# Patient Record
Sex: MALE | Race: BLACK OR AFRICAN AMERICAN | Employment: UNEMPLOYED | URBAN - METROPOLITAN AREA
[De-identification: names, ages, dates, MRNs, and addresses within clinical notes are randomized per-mention and may not be internally consistent; named-entity substitution may affect disease eponyms.]

---

## 2019-06-21 ENCOUNTER — HOSPITAL ENCOUNTER (EMERGENCY)
Age: 34
Discharge: HOME | End: 2019-06-21
Attending: EMERGENCY MEDICINE
Payer: COMMERCIAL

## 2019-06-21 VITALS
RESPIRATION RATE: 18 BRPM | HEART RATE: 78 BPM | OXYGEN SATURATION: 100 % | SYSTOLIC BLOOD PRESSURE: 118 MMHG | DIASTOLIC BLOOD PRESSURE: 75 MMHG | TEMPERATURE: 98.1 F

## 2019-06-21 DIAGNOSIS — M54.50 ACUTE BILATERAL LOW BACK PAIN WITHOUT SCIATICA: Primary | ICD-10-CM

## 2019-06-21 LAB
BILIRUBIN URINE: NEGATIVE
BLOOD, URINE: NEGATIVE
CLARITY: CLEAR
COLOR: YELLOW
GLUCOSE URINE: 100 MG/DL
KETONES, URINE: NEGATIVE MG/DL
LEUKOCYTE ESTERASE, URINE: NEGATIVE
MICROSCOPIC EXAMINATION: ABNORMAL
NITRITE, URINE: NEGATIVE
PH UA: 5.5 (ref 5–8)
PROTEIN UA: NEGATIVE MG/DL
SPECIFIC GRAVITY UA: 1.01 (ref 1–1.03)
URINE TYPE: ABNORMAL
UROBILINOGEN, URINE: 0.2 E.U./DL

## 2019-06-21 PROCEDURE — 81003 URINALYSIS AUTO W/O SCOPE: CPT

## 2019-06-21 PROCEDURE — 99283 EMERGENCY DEPT VISIT LOW MDM: CPT

## 2019-06-21 RX ORDER — IBUPROFEN 800 MG/1
800 TABLET ORAL EVERY 8 HOURS PRN
Qty: 20 TABLET | Refills: 0 | Status: SHIPPED | OUTPATIENT
Start: 2019-06-21 | End: 2020-01-21

## 2019-06-21 RX ORDER — IBUPROFEN 400 MG/1
800 TABLET ORAL ONCE
Status: DISCONTINUED | OUTPATIENT
Start: 2019-06-21 | End: 2019-06-21 | Stop reason: HOSPADM

## 2019-06-21 RX ORDER — METHOCARBAMOL 500 MG/1
1000 TABLET, FILM COATED ORAL ONCE
Status: DISCONTINUED | OUTPATIENT
Start: 2019-06-21 | End: 2019-06-21 | Stop reason: HOSPADM

## 2019-06-21 RX ORDER — METHOCARBAMOL 500 MG/1
500 TABLET, FILM COATED ORAL 4 TIMES DAILY
Qty: 20 TABLET | Refills: 0 | Status: SHIPPED | OUTPATIENT
Start: 2019-06-21 | End: 2019-06-26

## 2019-06-21 ASSESSMENT — PAIN SCALES - GENERAL: PAINLEVEL_OUTOF10: 6

## 2019-06-21 ASSESSMENT — PAIN DESCRIPTION - LOCATION: LOCATION: BACK

## 2019-06-21 ASSESSMENT — PAIN DESCRIPTION - PAIN TYPE: TYPE: ACUTE PAIN

## 2019-06-21 ASSESSMENT — PAIN DESCRIPTION - FREQUENCY: FREQUENCY: INTERMITTENT

## 2019-06-21 ASSESSMENT — PAIN DESCRIPTION - ORIENTATION: ORIENTATION: RIGHT;LEFT;MID

## 2019-06-21 ASSESSMENT — PAIN DESCRIPTION - DESCRIPTORS: DESCRIPTORS: CONSTANT;ACHING

## 2019-06-21 NOTE — ED PROVIDER NOTES
[address] [facility] [address] ENCOUNTER          PHYSICIAN ASSISTANT NOTE       Date of evaluation: 6/21/2019    Chief Complaint     Back Pain (Bilateral back pain and that was worse on the right that seems equal now. Pt states pain was worse in the morning. pt denies fevers and states urine has a bad odor. )      History of Present Illness     Shay Mays is a 36 y.o. male who presents bilateral \"kidney pain\" which he states he has had for 1 week. Patient states his pain is bilateral on his back and occurs in the morning after waking up. Patient states he arrives to work around 6 AM, and his symptoms stop after moving around approximately by 7:30 AM.  Patient has not taken Tylenol or ibuprofen for the symptoms. Patient is concerned, as he has had one kidney stone in the past, many years ago, which he passed on his own. Patient states at that time the pain wrapped around to his anterior abdomen, which she is not having at this time. Patient also does endorse a different smell to his urine. Denies fevers, chills, abdominal pain, nausea, vomiting, dysuria, hematuria. Review of Systems     Review of Systems   See HPI, all others negative. Past Medical, Surgical, Family, and Social History     He has a past medical history of Anxiety, Depression, Paranoia ([address] and PTSD (post-traumatic stress disorder). He has a past surgical history that includes fracture surgery. His family history is not on file. He reports that he has quit smoking. He started smoking about 2 years ago. He has never used smokeless tobacco. He reports that he drinks alcohol. He reports that he does not use drugs.     Medications     Discharge Medication List as of 6/21/2019  9:04 PM      CONTINUE these medications which have NOT CHANGED    Details   FLUoxetine (PROZAC) 10 MG capsule Take 10 mg by mouth dailyHistorical Med      GLYCOPYRROLATE PO Take by mouthHistorical Med             Allergies     He has No Known 6/21/2019  9:04 PM      START taking these medications    Details   methocarbamol (ROBAXIN) 500 MG tablet Take 1 tablet by mouth 4 times daily for 5 days, Disp-20 tablet, R-0Print      ibuprofen (ADVIL;MOTRIN) 800 MG tablet Take 1 tablet by mouth every 8 hours as needed for Pain (Take with food), Disp-20 tablet, R-0Print             DISPOSITION Decision To Discharge 06/21/2019 08:46:37 PM        Gerhardt Milliner, PA-C  06/22/19 0036

## 2019-06-21 NOTE — LETTER
[facility] Emergency Department  [address] [address]  Phone: [phone]  Fax: [phone]    No name on file. June 21, 2019     Patient: Oneyda Padilla   YOB: 1985   Date of Visit: 6/21/2019       To Whom It May Concern:    Floresita Galvan was seen in the Emergency Department on 6/21/2019. He may return to work on 6/21/2019. If you have any questions or concerns, please don't hesitate to call.     Sincerely,        Aixa Wheatley RN

## 2019-11-28 ENCOUNTER — HOSPITAL ENCOUNTER (EMERGENCY)
Age: 34
Discharge: HOME | End: 2019-11-28
Attending: EMERGENCY MEDICINE
Payer: COMMERCIAL

## 2019-11-28 VITALS
RESPIRATION RATE: 18 BRPM | HEART RATE: 65 BPM | SYSTOLIC BLOOD PRESSURE: 114 MMHG | OXYGEN SATURATION: 100 % | DIASTOLIC BLOOD PRESSURE: 74 MMHG | TEMPERATURE: 97.6 F

## 2019-11-28 DIAGNOSIS — R10.13 ABDOMINAL PAIN, EPIGASTRIC: Primary | ICD-10-CM

## 2019-11-28 DIAGNOSIS — A56.2 CHLAMYDIAL INFECTION OF GENITOURINARY TRACT: ICD-10-CM

## 2019-11-28 DIAGNOSIS — R06.6 HICCUPS: ICD-10-CM

## 2019-11-28 LAB
ALBUMIN SERPL-MCNC: 4.4 G/DL (ref 3.4–5)
ALP BLD-CCNC: 57 U/L (ref 40–129)
ALT SERPL-CCNC: 9 U/L (ref 10–40)
ANION GAP SERPL CALCULATED.3IONS-SCNC: 10 MMOL/L (ref 3–16)
AST SERPL-CCNC: 13 U/L (ref 15–37)
BASOPHILS ABSOLUTE: 0 K/UL (ref 0–0.2)
BASOPHILS RELATIVE PERCENT: 0.8 %
BILIRUB SERPL-MCNC: <0.2 MG/DL (ref 0–1)
BILIRUBIN DIRECT: <0.2 MG/DL (ref 0–0.3)
BILIRUBIN URINE: NEGATIVE
BILIRUBIN, INDIRECT: ABNORMAL MG/DL (ref 0–1)
BLOOD, URINE: NEGATIVE
BUN BLDV-MCNC: 14 MG/DL (ref 7–20)
CALCIUM SERPL-MCNC: 9.5 MG/DL (ref 8.3–10.6)
CHLORIDE BLD-SCNC: 100 MMOL/L (ref 99–110)
CLARITY: CLEAR
CO2: 28 MMOL/L (ref 21–32)
COLOR: YELLOW
CREAT SERPL-MCNC: 1 MG/DL (ref 0.9–1.3)
EOSINOPHILS ABSOLUTE: 0.2 K/UL (ref 0–0.6)
EOSINOPHILS RELATIVE PERCENT: 4.6 %
GFR AFRICAN AMERICAN: >60
GFR NON-AFRICAN AMERICAN: >60
GLUCOSE BLD-MCNC: 97 MG/DL (ref 70–99)
GLUCOSE URINE: NEGATIVE MG/DL
HCT VFR BLD CALC: 42.1 % (ref 40.5–52.5)
HEMOGLOBIN: 13.9 G/DL (ref 13.5–17.5)
KETONES, URINE: NEGATIVE MG/DL
LEUKOCYTE ESTERASE, URINE: NEGATIVE
LIPASE: 12 U/L (ref 13–60)
LYMPHOCYTES ABSOLUTE: 2.4 K/UL (ref 1–5.1)
LYMPHOCYTES RELATIVE PERCENT: 49.6 %
MCH RBC QN AUTO: 30.3 PG (ref 26–34)
MCHC RBC AUTO-ENTMCNC: 32.9 G/DL (ref 31–36)
MCV RBC AUTO: 91.9 FL (ref 80–100)
MICROSCOPIC EXAMINATION: NORMAL
MONOCYTES ABSOLUTE: 0.5 K/UL (ref 0–1.3)
MONOCYTES RELATIVE PERCENT: 11.1 %
NEUTROPHILS ABSOLUTE: 1.6 K/UL (ref 1.7–7.7)
NEUTROPHILS RELATIVE PERCENT: 33.9 %
NITRITE, URINE: NEGATIVE
PDW BLD-RTO: 13.8 % (ref 12.4–15.4)
PH UA: 6.5 (ref 5–8)
PLATELET # BLD: 245 K/UL (ref 135–450)
PMV BLD AUTO: 6.6 FL (ref 5–10.5)
POTASSIUM SERPL-SCNC: 4.1 MMOL/L (ref 3.5–5.1)
PROTEIN UA: NEGATIVE MG/DL
RBC # BLD: 4.58 M/UL (ref 4.2–5.9)
SODIUM BLD-SCNC: 138 MMOL/L (ref 136–145)
SPECIFIC GRAVITY UA: 1.01 (ref 1–1.03)
TOTAL PROTEIN: 7 G/DL (ref 6.4–8.2)
URINE TYPE: NORMAL
UROBILINOGEN, URINE: 0.2 E.U./DL
WBC # BLD: 4.8 K/UL (ref 4–11)

## 2019-11-28 PROCEDURE — 80076 HEPATIC FUNCTION PANEL: CPT

## 2019-11-28 PROCEDURE — 85025 COMPLETE CBC W/AUTO DIFF WBC: CPT

## 2019-11-28 PROCEDURE — 6360000002 HC RX W HCPCS: Performed by: EMERGENCY MEDICINE

## 2019-11-28 PROCEDURE — 96374 THER/PROPH/DIAG INJ IV PUSH: CPT

## 2019-11-28 PROCEDURE — 99283 EMERGENCY DEPT VISIT LOW MDM: CPT

## 2019-11-28 PROCEDURE — 6370000000 HC RX 637 (ALT 250 FOR IP): Performed by: EMERGENCY MEDICINE

## 2019-11-28 PROCEDURE — 83690 ASSAY OF LIPASE: CPT

## 2019-11-28 PROCEDURE — 80048 BASIC METABOLIC PNL TOTAL CA: CPT

## 2019-11-28 PROCEDURE — 81003 URINALYSIS AUTO W/O SCOPE: CPT

## 2019-11-28 PROCEDURE — 36415 COLL VENOUS BLD VENIPUNCTURE: CPT

## 2019-11-28 RX ORDER — AZITHROMYCIN 250 MG/1
1000 TABLET, FILM COATED ORAL ONCE
Status: COMPLETED | OUTPATIENT
Start: 2019-11-28 | End: 2019-11-28

## 2019-11-28 RX ORDER — METOCLOPRAMIDE HYDROCHLORIDE 5 MG/ML
10 INJECTION INTRAMUSCULAR; INTRAVENOUS ONCE
Status: COMPLETED | OUTPATIENT
Start: 2019-11-28 | End: 2019-11-28

## 2019-11-28 RX ORDER — BACLOFEN 10 MG/1
10 TABLET ORAL 3 TIMES DAILY PRN
Qty: 30 TABLET | Refills: 0 | Status: SHIPPED | OUTPATIENT
Start: 2019-11-28 | End: 2020-01-21

## 2019-11-28 RX ORDER — SUCRALFATE ORAL 1 G/10ML
1 SUSPENSION ORAL 4 TIMES DAILY
Qty: 400 ML | Refills: 0 | Status: SHIPPED | OUTPATIENT
Start: 2019-11-28 | End: 2020-01-21

## 2019-11-28 RX ORDER — PANTOPRAZOLE SODIUM 40 MG/1
40 TABLET, DELAYED RELEASE ORAL DAILY
Qty: 30 TABLET | Refills: 0 | Status: SHIPPED | OUTPATIENT
Start: 2019-11-28 | End: 2020-01-21

## 2019-11-28 RX ORDER — BACLOFEN 10 MG/1
10 TABLET ORAL ONCE
Status: COMPLETED | OUTPATIENT
Start: 2019-11-28 | End: 2019-11-28

## 2019-11-28 RX ORDER — ONDANSETRON 4 MG/1
4 TABLET, ORALLY DISINTEGRATING ORAL EVERY 8 HOURS PRN
Qty: 20 TABLET | Refills: 0 | Status: SHIPPED | OUTPATIENT
Start: 2019-11-28 | End: 2020-01-21

## 2019-11-28 RX ADMIN — LIDOCAINE HYDROCHLORIDE: 20 SOLUTION ORAL; TOPICAL at 02:00

## 2019-11-28 RX ADMIN — BACLOFEN 10 MG: 10 TABLET ORAL at 02:41

## 2019-11-28 RX ADMIN — METOCLOPRAMIDE 10 MG: 5 INJECTION, SOLUTION INTRAMUSCULAR; INTRAVENOUS at 01:59

## 2019-11-28 RX ADMIN — AZITHROMYCIN MONOHYDRATE 1000 MG: 250 TABLET ORAL at 01:59

## 2019-11-28 ASSESSMENT — ENCOUNTER SYMPTOMS
NAUSEA: 1
EYES NEGATIVE: 1
SHORTNESS OF BREATH: 0
COUGH: 0
ABDOMINAL PAIN: 1
VOMITING: 0
CONSTIPATION: 0
DIARRHEA: 0

## 2019-11-28 ASSESSMENT — PAIN DESCRIPTION - LOCATION: LOCATION: ABDOMEN

## 2019-11-28 ASSESSMENT — PAIN DESCRIPTION - PAIN TYPE: TYPE: ACUTE PAIN

## 2020-01-21 ENCOUNTER — HOSPITAL ENCOUNTER (EMERGENCY)
Age: 35
Discharge: HOME | End: 2020-01-21
Attending: EMERGENCY MEDICINE
Payer: COMMERCIAL

## 2020-01-21 VITALS
WEIGHT: 152 LBS | DIASTOLIC BLOOD PRESSURE: 66 MMHG | OXYGEN SATURATION: 100 % | SYSTOLIC BLOOD PRESSURE: 105 MMHG | TEMPERATURE: 98 F | BODY MASS INDEX: 21.2 KG/M2 | HEART RATE: 68 BPM | RESPIRATION RATE: 15 BRPM

## 2020-01-21 LAB
AMORPHOUS: ABNORMAL /HPF
BILIRUBIN URINE: NEGATIVE
BLOOD, URINE: NEGATIVE
CLARITY: CLEAR
COLOR: YELLOW
EPITHELIAL CELLS, UA: ABNORMAL /HPF
GLUCOSE URINE: 250 MG/DL
KETONES, URINE: NEGATIVE MG/DL
LEUKOCYTE ESTERASE, URINE: ABNORMAL
MICROSCOPIC EXAMINATION: YES
MUCUS: ABNORMAL /LPF
NITRITE, URINE: NEGATIVE
PH UA: 7.5 (ref 5–8)
PROTEIN UA: NEGATIVE MG/DL
RBC UA: ABNORMAL /HPF (ref 0–2)
SPECIFIC GRAVITY UA: 1.02 (ref 1–1.03)
URINE TYPE: ABNORMAL
UROBILINOGEN, URINE: 0.2 E.U./DL
WBC UA: ABNORMAL /HPF (ref 0–5)

## 2020-01-21 PROCEDURE — 81001 URINALYSIS AUTO W/SCOPE: CPT

## 2020-01-21 PROCEDURE — 87491 CHLMYD TRACH DNA AMP PROBE: CPT

## 2020-01-21 PROCEDURE — 6360000002 HC RX W HCPCS: Performed by: EMERGENCY MEDICINE

## 2020-01-21 PROCEDURE — 6370000000 HC RX 637 (ALT 250 FOR IP): Performed by: EMERGENCY MEDICINE

## 2020-01-21 PROCEDURE — 96372 THER/PROPH/DIAG INJ SC/IM: CPT

## 2020-01-21 PROCEDURE — 87591 N.GONORRHOEAE DNA AMP PROB: CPT

## 2020-01-21 PROCEDURE — 99283 EMERGENCY DEPT VISIT LOW MDM: CPT

## 2020-01-21 RX ORDER — AZITHROMYCIN 250 MG/1
1000 TABLET, FILM COATED ORAL ONCE
Status: COMPLETED | OUTPATIENT
Start: 2020-01-21 | End: 2020-01-21

## 2020-01-21 RX ORDER — CEFTRIAXONE SODIUM 250 MG/1
250 INJECTION, POWDER, FOR SOLUTION INTRAMUSCULAR; INTRAVENOUS ONCE
Status: COMPLETED | OUTPATIENT
Start: 2020-01-21 | End: 2020-01-21

## 2020-01-21 RX ADMIN — CEFTRIAXONE SODIUM 250 MG: 250 INJECTION, POWDER, FOR SOLUTION INTRAMUSCULAR; INTRAVENOUS at 15:18

## 2020-01-21 RX ADMIN — AZITHROMYCIN MONOHYDRATE 1000 MG: 250 TABLET ORAL at 15:18

## 2020-01-21 SDOH — HEALTH STABILITY: MENTAL HEALTH: HOW OFTEN DO YOU HAVE A DRINK CONTAINING ALCOHOL?: NEVER

## 2020-01-21 ASSESSMENT — PAIN SCALES - GENERAL: PAINLEVEL_OUTOF10: 6

## 2020-01-21 NOTE — ED PROVIDER NOTES
TRIAGE CHIEF COMPLAINT:   Chief Complaint   Patient presents with    Exposure to STD     patient states he has penile discharge \"clear\" since sunday. patient states he also has right lower quadrant pain with onset of sunday. denies pain with urination. unknow if exposed to std. HPI: Shahriar Hickman is a 36 y.o. male who presents to the Emergency Department with complaint of clear urethral discharge and concern for STD. Symptoms started 2 days ago. He has had recent new sexual partner. He also complains of some soreness in the right lateral lower abdomen area. He states the pain is only present with certain movements that he makes. 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 He denies any fever or chills. No vomiting, diarrhea or constipation. His appetite is been normal.  He has no dysuria, frequency or hematuria. Denies flank pain. REVIEW OF SYSTEMS:  6 systems reviewed. Pertinent positives per HPI. Otherwise noted to be negative. Nursing notes reviewed and agree with above. Past medical/surgical history reviewed.     MEDICATIONS   Patient's Medications   New Prescriptions    No medications on file   Previous Medications    No medications on file   Modified Medications    No medications on file   Discontinued Medications    BACLOFEN (LIORESAL) 10 MG TABLET    Take 1 tablet by mouth 3 times daily as needed (hiccups)    FLUOXETINE (PROZAC) 10 MG CAPSULE    Take 10 mg by mouth daily    GLYCOPYRROLATE PO    Take by mouth    IBUPROFEN (ADVIL;MOTRIN) 800 MG TABLET    Take 1 tablet by mouth every 8 hours as needed for Pain (Take with food)    ONDANSETRON (ZOFRAN ODT) 4 MG DISINTEGRATING TABLET    Take 1 tablet by mouth every 8 hours as needed for Nausea    PANTOPRAZOLE (PROTONIX) 40 MG TABLET    Take 1 tablet by mouth daily    SUCRALFATE (CARAFATE) 1 GM/10ML SUSPENSION    Take 10 mLs by mouth 4 times daily         ALLERGIES No Known Allergies      BP 105/66   Pulse 68   Temp 98 °F (36.7 °C) (Oral)   Resp 15   Wt 68.9 kg (152 lb)   SpO2 100%   BMI 21.20 kg/m²   General:  No acute distress. Non toxic appearance  Head:   Normocephalic and atraumatic  Eyes:   Conjunctiva clear, CRIS, EOM's intact. Sclera anicteric. ENT:   Mucous membranes moist  Neck:   Supple. No adenopathy or jugular venous distension  Lungs/Chest:  No respiratory distress  CVS:   Regular rate and rhythm  Abdomen: Bowel sounds normal.  Soft and nontender. No evidence of hernia. No adenopathy. Extremities:  Full range of motion  Skin:   No rashes or lesions to exposed skin  Back:   CVA tenderness. Neuro:  Alert and OX3. Speech clear and appropriate. No upper/lower extremity weakness. Normal sensation in all extremities. No facial asymmetry or weakness. Gait normal.  Psych:   Affect normal. Mood normal   exam was deferred by the patient      RADIOLOGY:      LAB  Labs Reviewed   URINALYSIS - Abnormal; Notable for the following components:       Result Value    Glucose, Ur 250 (*)     Leukocyte Esterase, Urine TRACE (*)     All other components within normal limits    Narrative:     Performed at:  [facility]  [address],  [place]   Phone [phone]   MICROSCOPIC URINALYSIS - Abnormal; Notable for the following components:    Mucus, UA Rare (*)     Amorphous, UA 2+ (*)     All other components within normal limits    Narrative:     Performed at:  [facility]  [facility] [address],  [place]   Phone [phone]   C. TRACHOMATIS N.GONORRHOEAE DNA, URINE       ED COURSE / MDM:  64-year-old male with complaints of clear urethral discharge and concern for STD. He also states he has some right lateral lower abdominal pain but it only occurs with certain movements. He does not have pain now and abdomen is soft and nontender. No CVA tenderness. No fever or chills.   No nausea vomiting or bowel complaint. Urinalysis was normal.  Urine DNA probe was sent and results are pending. Patient request to be treated for gonorrhea/chlamydia and was given Rocephin/azithromycin. Recommend his sexual partner be checked/treated and no intercourse for 1 week after both have been treated. Clinically the patient has nothing to suggest a surgical abdomen, bowel obstruction, pyelonephritis, pancreatitis or ureteral colic. I discussed with Santiago Laynes the results of evaluation in the Emergency Department, diagnosis, care and prognosis. The plan is to discharge to home. The patient is in agreement with the plan and questions have been answered. I also discussed with the patient and/or family the reasons which may require a return visit and the importance of follow-up care.        (Please note that portions of this note may have been completed with a voice recognition program.  Efforts were made to edit the dictation but occasionally words are mis-transcribed)        FINAL IMPRESSION:  1 --urethritis  2 --possible STD exposure                Candelario Quintana MD  01/21/20 9753

## 2020-01-22 LAB
C. TRACHOMATIS DNA ,URINE: NEGATIVE
N. GONORRHOEAE DNA, URINE: NEGATIVE

## 2021-03-17 ENCOUNTER — HOSPITAL ENCOUNTER (EMERGENCY)
Age: 36
Discharge: HOME | End: 2021-03-17
Attending: EMERGENCY MEDICINE
Payer: COMMERCIAL

## 2021-03-17 VITALS
DIASTOLIC BLOOD PRESSURE: 77 MMHG | TEMPERATURE: 98.4 F | HEART RATE: 60 BPM | HEIGHT: 71 IN | SYSTOLIC BLOOD PRESSURE: 111 MMHG | WEIGHT: 150 LBS | RESPIRATION RATE: 16 BRPM | BODY MASS INDEX: 21 KG/M2 | OXYGEN SATURATION: 100 %

## 2021-03-17 DIAGNOSIS — R68.84 MANDIBULAR PAIN: Primary | ICD-10-CM

## 2021-03-17 DIAGNOSIS — S00.83XA CONTUSION OF MANDIBULAR JOINT AREA, INITIAL ENCOUNTER: ICD-10-CM

## 2021-03-17 PROCEDURE — 99284 EMERGENCY DEPT VISIT MOD MDM: CPT

## 2021-03-17 ASSESSMENT — PAIN DESCRIPTION - LOCATION: LOCATION: JAW

## 2021-03-17 ASSESSMENT — PAIN DESCRIPTION - PAIN TYPE: TYPE: ACUTE PAIN
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
## 2021-03-17 NOTE — ED NOTES
Patient prepared for and ready to be discharged. Patient discharged at this time in no acute distress after verbalizing understanding of discharge instructions. Patient left after receiving After Visit Summary instructions.         Efraín Pelaez RN  03/17/21 2410

## 2022-06-06 ENCOUNTER — HOSPITAL ENCOUNTER (EMERGENCY)
Age: 37
Discharge: HOME | End: 2022-06-06
Attending: EMERGENCY MEDICINE
Payer: COMMERCIAL

## 2022-06-06 VITALS
SYSTOLIC BLOOD PRESSURE: 110 MMHG | TEMPERATURE: 99.3 F | HEART RATE: 86 BPM | RESPIRATION RATE: 16 BRPM | DIASTOLIC BLOOD PRESSURE: 72 MMHG | OXYGEN SATURATION: 99 %

## 2022-06-06 DIAGNOSIS — J02.9 SORE THROAT: ICD-10-CM

## 2022-06-06 DIAGNOSIS — S29.012A STRAIN OF LATISSIMUS DORSI MUSCLE, INITIAL ENCOUNTER: Primary | ICD-10-CM

## 2022-06-06 LAB
S PYO AG THROAT QL: NEGATIVE
SARS-COV-2, NAAT: NOT DETECTED

## 2022-06-06 PROCEDURE — 6370000000 HC RX 637 (ALT 250 FOR IP): Performed by: STUDENT IN AN ORGANIZED HEALTH CARE EDUCATION/TRAINING PROGRAM

## 2022-06-06 PROCEDURE — 87880 STREP A ASSAY W/OPTIC: CPT

## 2022-06-06 PROCEDURE — 99283 EMERGENCY DEPT VISIT LOW MDM: CPT

## 2022-06-06 PROCEDURE — 87081 CULTURE SCREEN ONLY: CPT

## 2022-06-06 PROCEDURE — 87077 CULTURE AEROBIC IDENTIFY: CPT

## 2022-06-06 PROCEDURE — 87635 SARS-COV-2 COVID-19 AMP PRB: CPT

## 2022-06-06 RX ORDER — LIDOCAINE 4 G/G
1 PATCH TOPICAL ONCE
Status: DISCONTINUED | OUTPATIENT
Start: 2022-06-06 | End: 2022-06-06 | Stop reason: HOSPADM

## 2022-06-06 RX ORDER — IBUPROFEN 600 MG/1
600 TABLET ORAL 4 TIMES DAILY PRN
Qty: 20 TABLET | Refills: 0 | Status: SHIPPED | OUTPATIENT
Start: 2022-06-06 | End: 2022-06-11

## 2022-06-06 RX ORDER — LIDOCAINE 4 G/G
1 PATCH TOPICAL DAILY
Qty: 5 PATCH | Refills: 0 | Status: SHIPPED | OUTPATIENT
Start: 2022-06-06 | End: 2022-06-11

## 2022-06-06 ASSESSMENT — PAIN SCALES - GENERAL: PAINLEVEL_OUTOF10: 6

## 2022-06-06 ASSESSMENT — ENCOUNTER SYMPTOMS
ABDOMINAL DISTENTION: 0
ABDOMINAL PAIN: 0
COUGH: 0
SHORTNESS OF BREATH: 0
BACK PAIN: 1

## 2022-06-06 ASSESSMENT — PAIN - FUNCTIONAL ASSESSMENT
PAIN_FUNCTIONAL_ASSESSMENT: 0-10
PAIN_FUNCTIONAL_ASSESSMENT: PREVENTS OR INTERFERES SOME ACTIVE ACTIVITIES AND ADLS

## 2022-06-06 ASSESSMENT — PAIN DESCRIPTION - PAIN TYPE: TYPE: ACUTE PAIN

## 2022-06-06 ASSESSMENT — PAIN DESCRIPTION - DESCRIPTORS: DESCRIPTORS: SORE

## 2022-06-06 ASSESSMENT — PAIN DESCRIPTION - LOCATION: LOCATION: BACK;NECK

## 2022-06-06 ASSESSMENT — PAIN SCALES - WONG BAKER: WONGBAKER_NUMERICALRESPONSE: 6

## 2022-06-06 ASSESSMENT — PAIN DESCRIPTION - FREQUENCY: FREQUENCY: CONTINUOUS

## 2022-06-06 ASSESSMENT — PAIN DESCRIPTION - ONSET: ONSET: GRADUAL

## 2022-06-06 NOTE — H&P
Date of evaluation: 6/6/2022    Chief Complaint   Pharyngitis (+ back and neck pain)      Nursing Notes, Past Medical Hx, Past Surgical Hx, Social Hx,Allergies, and Family Hx were reviewed. History of Present Illness     Shay Whiteside is a 40 y.o. male who presents with neck and back pain. Reports about 2 weeks ago he developed L upper back pain and stiffness worst in the morning after limited movement. Over the past couple weeks it has gotten progressively worse and spread to the L side of his neck. Now most back and neck motion is painful. Reports he took an OTC muscle relaxer and ibuprofen which have helped. Denies any inciting event or trauma. Denies any pain with UE movement. 3 days ago he also developed a mild sore throat that improved with ibuprofen. Denies cough, fever/chills, SOB. Review of Systems   Review of Systems   Constitutional: Negative for chills, diaphoresis and fever. Respiratory: Negative for cough and shortness of breath. Cardiovascular: Negative for chest pain and leg swelling. Gastrointestinal: Negative for abdominal distention and abdominal pain. Genitourinary: Negative for decreased urine volume, difficulty urinating, dysuria and urgency. Musculoskeletal: Positive for back pain, neck pain and neck stiffness. Negative for arthralgias, joint swelling and myalgias. Neurological: Positive for headaches. Negative for dizziness, weakness and light-headedness. Past Medical, Surgical, Family, and Social History         Diagnosis Date    Anxiety     Depression     [address]ia ([facility] [address]     PTSD (post-traumatic stress disorder)          Procedure Laterality Date    FRACTURE SURGERY       His family history is not on file. He reports that he has quit smoking. He started smoking about 5 years ago. He has never used smokeless tobacco. He reports that he does not drink alcohol and does not use drugs.     Medications     Previous Medications    No medications on file Allergies     He has No Known Allergies. Physical Exam     INITIAL VITALS: /72   Pulse 88   Temp 99.3 °F (37.4 °C) (Oral)   Resp 18   SpO2 98%      Physical Exam  Constitutional:       General: He is not in acute distress. Appearance: He is well-developed. He is not ill-appearing. HENT:      Head: Normocephalic and atraumatic. Mouth/Throat:      Mouth: Mucous membranes are moist. No oral lesions. Pharynx: Oropharynx is clear. No pharyngeal swelling, oropharyngeal exudate or posterior oropharyngeal erythema. Neck:      Comments: Has full ROM but experiences L neck pain with L rotation, L lateral flexion, flexion, and extension. Musculoskeletal:         General: Tenderness (L upper back along lattissmus dorsi) present. Normal range of motion. Cervical back: Normal range of motion and neck supple. Right lower leg: No edema. Left lower leg: No edema. Comments: Full ROM but pain with lateral rotation, flexion, and extension   Neurological:      General: No focal deficit present. Mental Status: He is alert and oriented to person, place, and time. Diagnostic Results     RADIOLOGY:  No orders to display       LABS:   Labs Reviewed   STREP SCREEN GROUP A THROAT   CULTURE, BETA STREP CONFIRM PLATES   [id number], RAPID       RECENT VITALS:  BP: 113/72, Temp: 99.3 °F (37.4 °C), Heart Rate: 88,Resp: 18     Procedures     None    ED Course     The patient was given the following medications:  Orders Placed This Encounter   Medications    lidocaine 4 % external patch 1 patch            CONSULTS:  None    MEDICAL DECISION MAKING     Shay Packer is a 40 y.o. male pw L neck and upper back pain. Though there was no inciting factor, the pattern of his pain is indicative of L lattissimus dorsi muscle strain. Given he has no cardiovascular sxs, there is very low concern this is anything but MSK-related pain.  Will give patient lidocaine patch along with NSAIDs/tylenol for pain management. Counseled on rest and stretching. Advised to follow up with PCP. His sore throat that he has developed is likely a mild viral pharyngitis especially given he has had no systemic sxs and it is already improving. GAS screen negative. COVID test ordered for r/o. Can continue with symptomatic management. This patient was also evaluated by the attending physician. All care plans were discussed and agreed upon. Clinical Impression     1. Strain of latissimus dorsi muscle, initial encounter    2. Sore throat        Disposition/Plan     PATIENT REFERRED TO:  No follow-up provider specified.     DISCHARGE MEDICATIONS:  New Prescriptions    No medications on file       [address] [facility]

## 2022-06-06 NOTE — Clinical Note
Lloyd Spencer was seen and treated in our emergency department on 6/6/2022. He may return to work on 06/07/2022. For Sunday 6/5/2022 and Monday 6/6/2022     If you have any questions or concerns, please don't hesitate to call.       Toni García MD

## 2022-06-07 LAB
ORGANISM: ABNORMAL
S PYO THROAT QL CULT: ABNORMAL
S PYO THROAT QL CULT: ABNORMAL

## 2022-06-07 NOTE — ED PROVIDER NOTES
ED Attending Attestation Note     Date of evaluation: 6/6/2022    This patient was seen by the resident. I have seen and examined the patient, agree with the workup, evaluation, management and diagnosis. The care plan has been discussed. I have reviewed the ECG and concur with the resident's interpretation. My assessment reveals well-appearing adult male with tenderness and pain along the left paraspinal musculature radiating up towards the neck, and down towards the lateral aspect of the ribs. It is not pleuritic in nature. No known injury to the area. Symptoms ongoing for some time, per consistent with muscle strain/spasm. Also noted to have some mild pharyngitis without limitation range of motion the neck, and says that it really only affects him in the morning feels better throughout the day. No posterior pharyngeal swelling, no asymmetric swelling in the pharynx.        Vernon Milligan MD  06/06/22 2368

## 2022-06-13 NOTE — ED NOTES
Date of evaluation: 6/6/2022     Chief Complaint   Pharyngitis (+ back and neck pain)        Nursing Notes, Past Medical Hx, Past Surgical Hx, Social Hx,Allergies, and Family Hx were reviewed.     History of Present Illness      Donavan Jamil is a 40 y.o. male who presents with neck and back pain. Reports about 2 weeks ago he developed L upper back pain and stiffness worst in the morning after limited movement. Over the past couple weeks it has gotten progressively worse and spread to the L side of his neck. Now most back and neck motion is painful. Reports he took an OTC muscle relaxer and ibuprofen which have helped. Denies any inciting event or trauma. Denies any pain with UE movement.     3 days ago he also developed a mild sore throat that improved with ibuprofen. Denies cough, fever/chills, SOB.     Review of Systems   Review of Systems   Constitutional: Negative for chills, diaphoresis and fever. Respiratory: Negative for cough and shortness of breath. Cardiovascular: Negative for chest pain and leg swelling. Gastrointestinal: Negative for abdominal distention and abdominal pain. Genitourinary: Negative for decreased urine volume, difficulty urinating, dysuria and urgency. Musculoskeletal: Positive for back pain, neck pain and neck stiffness. Negative for arthralgias, joint swelling and myalgias. Neurological: Positive for headaches. Negative for dizziness, weakness and light-headedness.            Past Medical, Surgical, Family, and Social History      Past Medical History             Diagnosis Date    Anxiety      Depression      Paranoia ([facility] [address]      PTSD (post-traumatic stress disorder)           Past Surgical History             Procedure Laterality Date    FRACTURE SURGERY             His family history is not on file. He reports that he has quit smoking. He started smoking about 5 years ago.  He has never used smokeless tobacco. He reports that he does not drink alcohol and does not use drugs.     Medications          Previous Medications     No medications on file         Allergies      He has No Known Allergies.     Physical Exam      INITIAL VITALS: /72   Pulse 88   Temp 99.3 °F (37.4 °C) (Oral)   Resp 18   SpO2 98%       Physical Exam  Constitutional:       General: He is not in acute distress. Appearance: He is well-developed. He is not ill-appearing. HENT:      Head: Normocephalic and atraumatic. Mouth/Throat:      Mouth: Mucous membranes are moist. No oral lesions. Pharynx: Oropharynx is clear. No pharyngeal swelling, oropharyngeal exudate or posterior oropharyngeal erythema. Neck:      Comments: Has full ROM but experiences L neck pain with L rotation, L lateral flexion, flexion, and extension. Musculoskeletal:         General: Tenderness (L upper back along lattissmus dorsi) present. Normal range of motion. Cervical back: Normal range of motion and neck supple. Right lower leg: No edema. Left lower leg: No edema. Comments: Full ROM but pain with lateral rotation, flexion, and extension   Neurological:      General: No focal deficit present. Mental Status: He is alert and oriented to person, place, and time.             Diagnostic Results      RADIOLOGY:  No orders to display         LABS:   Labs Reviewed   STREP SCREEN GROUP A THROAT   CULTURE, BETA STREP CONFIRM PLATES   [id number], RAPID         RECENT VITALS:  BP: 113/72, Temp: 99.3 °F (37.4 °C), Heart Rate: 88,Resp: 18      Procedures      None     ED Course      The patient was given the following medications:  Encounter Medications        Orders Placed This Encounter   Medications    lidocaine 4 % external patch 1 patch               CONSULTS:  None     MEDICAL DECISION MAKING      Shay Ly is a 40 y.o. male pw L neck and upper back pain. Though there was no inciting factor, the pattern of his pain is indicative of L lattissimus dorsi muscle strain.  Given he has no cardiovascular sxs, there is very low concern this is anything but MSK-related pain. Will give patient lidocaine patch along with NSAIDs/tylenol for pain management. Counseled on rest and stretching. Advised to follow up with PCP.     His sore throat that he has developed is likely a mild viral pharyngitis especially given he has had no systemic sxs and it is already improving. GAS screen negative. COVID test ordered for r/o. Can continue with symptomatic management.     This patient was also evaluated by the attending physician. All care plans were discussed and agreed upon.     Clinical Impression      1. Strain of latissimus dorsi muscle, initial encounter    2.  Sore throat          Disposition/Plan      PATIENT REFERRED TO:  No follow-up provider specified.     DISCHARGE MEDICATIONS:      New Prescriptions     No medications on file         [address], MD  Resident  06/13/22 3155

## 2022-06-14 NOTE — ED PROVIDER NOTES
Date of evaluation: 6/6/2022     Chief Complaint   Pharyngitis (+ back and neck pain)        Nursing Notes, Past Medical Hx, Past Surgical Hx, Social Hx,Allergies, and Family Hx were reviewed.     History of Present Illness      Shay Cuellar is a 40 y. o. male who presents with neck and back pain. Reports about 2 weeks ago he developed L upper back pain and stiffness worst in the morning after limited movement. Over the past couple weeks it has gotten progressively worse and spread to the L side of his neck. Now most back and neck motion is painful. Reports he took an OTC muscle relaxer and ibuprofen which have helped. Denies any inciting event or trauma. Denies any pain with UE movement.     3 days ago he also developed a mild sore throat that improved with ibuprofen. Denies cough, fever/chills, SOB.     Review of Systems   Review of Systems   Constitutional: Negative for chills, diaphoresis and fever. Respiratory: Negative for cough and shortness of breath.    Cardiovascular: Negative for chest pain and leg swelling. Gastrointestinal: Negative for abdominal distention and abdominal pain. Genitourinary: Negative for decreased urine volume, difficulty urinating, dysuria and urgency. Musculoskeletal: Positive for back pain, neck pain and neck stiffness. Negative for arthralgias, joint swelling and myalgias. Neurological: Positive for headaches. Negative for dizziness, weakness and light-headedness.            Past Medical, Surgical, Family, and Social History      Past Medical History                Diagnosis Date    Anxiety      Depression      Paranoia ([facility] [address]      PTSD (post-traumatic stress disorder)           Past Surgical History                 Procedure Laterality Date    FRACTURE SURGERY             His family history is not on file. He reports that he has quit smoking. He started smoking about 5 years ago.  He has never used smokeless tobacco. He reports that he does not drink alcohol and does not use drugs.     Medications            Previous Medications     No medications on file         Allergies      He has No Known Allergies.     Physical Exam      INITIAL VITALS: /72   Pulse 88   Temp 99.3 °F (37.4 °C) (Oral)   Resp 18   SpO2 98%       Physical Exam  Constitutional:       General: He is not in acute distress.     Appearance: He is well-developed. He is not ill-appearing. HENT:      Head: Normocephalic and atraumatic.      Mouth/Throat:      Mouth: Mucous membranes are moist. No oral lesions.      Pharynx: Oropharynx is clear. No pharyngeal swelling, oropharyngeal exudate or posterior oropharyngeal erythema. Neck:      Comments: Has full ROM but experiences L neck pain with L rotation, L lateral flexion, flexion, and extension. Musculoskeletal:         General: Tenderness (L upper back along lattissmus dorsi) present. Normal range of motion.      Cervical back: Normal range of motion and neck supple.      Right lower leg: No edema.      Left lower leg: No edema.      Comments: Full ROM but pain with lateral rotation, flexion, and extension   Neurological:      General: No focal deficit present.      Mental Status: He is alert and oriented to person, place, and time.             Diagnostic Results      RADIOLOGY:  No orders to display         LABS:   Labs Reviewed   STREP SCREEN GROUP A THROAT   CULTURE, BETA STREP CONFIRM PLATES   [id number], RAPID         RECENT VITALS:  BP: 113/72, Temp: 99.3 °F (37.4 °C), Heart Rate: 88,Resp: 18      Procedures      None     ED Course      The patient was given the following medications:  Encounter Medications          Orders Placed This Encounter   Medications    lidocaine 4 % external patch 1 patch               CONSULTS:  None     MEDICAL DECISION MAKING      Shay Cuellar is a 40 y. o. male pw L neck and upper back pain. Though there was no inciting factor, the pattern of his pain is indicative of L lattissimus dorsi muscle strain.  Given he has no cardiovascular sxs, there is very low concern this is anything but MSK-related pain. Will give patient lidocaine patch along with NSAIDs/tylenol for pain management. Counseled on rest and stretching. Advised to follow up with PCP.     His sore throat that he has developed is likely a mild viral pharyngitis especially given he has had no systemic sxs and it is already improving. GAS screen negative. COVID test ordered for r/o. Can continue with symptomatic management.     This patient was also evaluated by the attending physician. All care plans were discussed and agreed upon.     Clinical Impression      1. Strain of latissimus dorsi muscle, initial encounter    2.  Sore throat          Disposition/Plan      PATIENT REFERRED TO:  No follow-up provider specified.     DISCHARGE MEDICATIONS:        New Prescriptions     No medications on file         [address] [facility] MD Manpreet  Resident  06/13/22 279 [address], [place]  Resident  06/14/22 1750

## 2023-04-17 ENCOUNTER — HOSPITAL ENCOUNTER (EMERGENCY)
Age: 38
Discharge: HOME | End: 2023-04-17
Attending: EMERGENCY MEDICINE
Payer: COMMERCIAL

## 2023-04-17 VITALS
SYSTOLIC BLOOD PRESSURE: 113 MMHG | DIASTOLIC BLOOD PRESSURE: 66 MMHG | RESPIRATION RATE: 16 BRPM | WEIGHT: 158.5 LBS | HEIGHT: 72 IN | OXYGEN SATURATION: 98 % | TEMPERATURE: 97.7 F | HEART RATE: 65 BPM | BODY MASS INDEX: 21.47 KG/M2

## 2023-04-17 DIAGNOSIS — R10.13 EPIGASTRIC PAIN: Primary | ICD-10-CM

## 2023-04-17 LAB
ALBUMIN SERPL-MCNC: 4.4 G/DL (ref 3.4–5)
ALBUMIN/GLOB SERPL: 1.3 {RATIO} (ref 1.1–2.2)
ALP SERPL-CCNC: 56 U/L (ref 40–129)
ALT SERPL-CCNC: 15 U/L (ref 10–40)
ANION GAP SERPL CALCULATED.3IONS-SCNC: 11 MMOL/L (ref 3–16)
AST SERPL-CCNC: 31 U/L (ref 15–37)
BASOPHILS # BLD: 0 K/UL (ref 0–0.2)
BASOPHILS NFR BLD: 0.2 %
BILIRUB SERPL-MCNC: 0.3 MG/DL (ref 0–1)
BUN SERPL-MCNC: 10 MG/DL (ref 7–20)
CALCIUM SERPL-MCNC: 9.6 MG/DL (ref 8.3–10.6)
CHLORIDE SERPL-SCNC: 98 MMOL/L (ref 99–110)
CO2 SERPL-SCNC: 28 MMOL/L (ref 21–32)
CREAT SERPL-MCNC: 0.9 MG/DL (ref 0.9–1.3)
DEPRECATED RDW RBC AUTO: 13.7 % (ref 12.4–15.4)
EOSINOPHIL # BLD: 0.1 K/UL (ref 0–0.6)
EOSINOPHIL NFR BLD: 1.8 %
GFR SERPLBLD CREATININE-BSD FMLA CKD-EPI: >60 ML/MIN/{1.73_M2}
GLUCOSE SERPL-MCNC: 114 MG/DL (ref 70–99)
HCT VFR BLD AUTO: 44.3 % (ref 40.5–52.5)
HGB BLD-MCNC: 15.5 G/DL (ref 13.5–17.5)
LIPASE SERPL-CCNC: 17 U/L (ref 13–60)
LYMPHOCYTES # BLD: 2.1 K/UL (ref 1–5.1)
LYMPHOCYTES NFR BLD: 31.5 %
MCH RBC QN AUTO: 31.2 PG (ref 26–34)
MCHC RBC AUTO-ENTMCNC: 34.9 G/DL (ref 31–36)
MCV RBC AUTO: 89.4 FL (ref 80–100)
MONOCYTES # BLD: 0.7 K/UL (ref 0–1.3)
MONOCYTES NFR BLD: 10 %
NEUTROPHILS # BLD: 3.8 K/UL (ref 1.7–7.7)
NEUTROPHILS NFR BLD: 56.5 %
PLATELET # BLD AUTO: 271 K/UL (ref 135–450)
PMV BLD AUTO: 6.6 FL (ref 5–10.5)
POTASSIUM SERPL-SCNC: 4.9 MMOL/L (ref 3.5–5.1)
PROT SERPL-MCNC: 7.8 G/DL (ref 6.4–8.2)
RBC # BLD AUTO: 4.96 M/UL (ref 4.2–5.9)
SODIUM SERPL-SCNC: 137 MMOL/L (ref 136–145)
WBC # BLD AUTO: 6.7 K/UL (ref 4–11)

## 2023-04-17 PROCEDURE — 2500000003 HC RX 250 WO HCPCS

## 2023-04-17 PROCEDURE — 99284 EMERGENCY DEPT VISIT MOD MDM: CPT

## 2023-04-17 PROCEDURE — A4216 STERILE WATER/SALINE, 10 ML: HCPCS

## 2023-04-17 PROCEDURE — 96375 TX/PRO/DX INJ NEW DRUG ADDON: CPT

## 2023-04-17 PROCEDURE — 83690 ASSAY OF LIPASE: CPT

## 2023-04-17 PROCEDURE — 96374 THER/PROPH/DIAG INJ IV PUSH: CPT

## 2023-04-17 PROCEDURE — 80053 COMPREHEN METABOLIC PANEL: CPT

## 2023-04-17 PROCEDURE — 85025 COMPLETE CBC W/AUTO DIFF WBC: CPT

## 2023-04-17 PROCEDURE — 2580000003 HC RX 258

## 2023-04-17 PROCEDURE — 6360000002 HC RX W HCPCS

## 2023-04-17 RX ORDER — FAMOTIDINE 20 MG/1
20 TABLET, FILM COATED ORAL 2 TIMES DAILY
Qty: 60 TABLET | Refills: 3 | Status: SHIPPED | OUTPATIENT
Start: 2023-04-17

## 2023-04-17 RX ORDER — SODIUM CHLORIDE, SODIUM LACTATE, POTASSIUM CHLORIDE, AND CALCIUM CHLORIDE .6; .31; .03; .02 G/100ML; G/100ML; G/100ML; G/100ML
1000 INJECTION, SOLUTION INTRAVENOUS ONCE
Status: COMPLETED | OUTPATIENT
Start: 2023-04-17 | End: 2023-04-17

## 2023-04-17 RX ORDER — ONDANSETRON 2 MG/ML
4 INJECTION INTRAMUSCULAR; INTRAVENOUS ONCE
Status: COMPLETED | OUTPATIENT
Start: 2023-04-17 | End: 2023-04-17

## 2023-04-17 RX ADMIN — ONDANSETRON 4 MG: 2 INJECTION INTRAMUSCULAR; INTRAVENOUS at 13:13

## 2023-04-17 RX ADMIN — SODIUM CHLORIDE, POTASSIUM CHLORIDE, SODIUM LACTATE AND CALCIUM CHLORIDE 1000 ML: 600; 310; 30; 20 INJECTION, SOLUTION INTRAVENOUS at 13:15

## 2023-04-17 RX ADMIN — FAMOTIDINE 20 MG: 10 INJECTION, SOLUTION INTRAVENOUS at 13:13

## 2023-04-17 ASSESSMENT — PAIN - FUNCTIONAL ASSESSMENT
PAIN_FUNCTIONAL_ASSESSMENT: 0-10
PAIN_FUNCTIONAL_ASSESSMENT: NONE - DENIES PAIN

## 2023-04-17 ASSESSMENT — PAIN DESCRIPTION - LOCATION: LOCATION: ABDOMEN

## 2023-04-17 ASSESSMENT — LIFESTYLE VARIABLES
HOW MANY STANDARD DRINKS CONTAINING ALCOHOL DO YOU HAVE ON A TYPICAL DAY: PATIENT DOES NOT DRINK
HOW OFTEN DO YOU HAVE A DRINK CONTAINING ALCOHOL: NEVER

## 2023-04-17 ASSESSMENT — PAIN SCALES - GENERAL: PAINLEVEL_OUTOF10: 10

## 2023-04-17 ASSESSMENT — PAIN DESCRIPTION - ORIENTATION: ORIENTATION: ANTERIOR

## 2023-04-17 ASSESSMENT — PAIN DESCRIPTION - ONSET: ONSET: PROGRESSIVE

## 2023-04-17 ASSESSMENT — PAIN DESCRIPTION - DESCRIPTORS: DESCRIPTORS: ACHING

## 2023-04-17 ASSESSMENT — PAIN DESCRIPTION - PAIN TYPE: TYPE: ACUTE PAIN

## 2023-04-17 NOTE — DISCHARGE INSTRUCTIONS
Dyspepsia (Indigestion) Discharge Instructions    You have dyspepsia (indigestion). This may be due to a number of causes including peptic ulcer disease, diet, smoking or alcohol, or an infection. _______________________________________________________________________    Diet and Lifestyle    People with dyspepsia should eat a healthy balanced diet. It does not help to eat more often or increase the amount of milk and dairy products you consume. These changes may even cause more stomach acid. Avoid foods and drinks that cause discomfort for you. For many people these include alcohol, coffee, caffeinated soda, fatty foods, chocolate, and spicy foods. Avoid eating late night snacks. Other things you can do to ease your symptoms and help healing include: If you smoke or chew tobacco, try to quit. Tobacco will slow the healing of stomach. Talk to your doctor about getting help to quit using tobacco    Try to reduce your stress level and learn ways to better manage stress. Avoid drugs such as ibuprofen (Advil, Motrin), or naproxen (Aleve, Naprosyn). Take acetaminophen (Tylenol), as directed on the bottle, to relieve aches and pain. Take all medicines with plenty of water. Follow Up    You need follow up with a Primary Care Physician in 4-8 weeks. We recommend that you discuss testing for H. pylori with this physician. When to call the doctor     Get medical help right away if you:        Develop severe or new/worse abdominal pain        Have light headedness, such as fainting or confusion, or excessive sweating,        Vomit blood        See blood in your stool (maroon, dark, or tarry black stools)      Call your doctor if:        You feel dizzy or light-headed        You feel full after eating a small meal portion        You experience unintentional weight loss greater than 5-10 pounds.         You are vomiting and unable to keep down food or water        You lose your appetite

## 2023-04-17 NOTE — ED PROVIDER NOTES
ED Attending Attestation Note     Date of evaluation: 4/17/2023    This patient was seen by the resident. I have seen and examined the patient, agree with the workup, evaluation, management and diagnosis. The care plan has been discussed. My assessment reveals a young gentleman who presents with abdominal pain. Abdomen is soft and nontender after being treated symptomatically.      Andrés Spring MD  04/17/23 1262

## 2023-04-17 NOTE — ED PROVIDER NOTES
[address] [facility] RESIDENT NOTE       Date of evaluation: 4/17/2023    Chief Complaint     Abdominal Pain (Nausea and vomiting, abdominal pain, intense after eating spicy foods recently.)      History of Present Illness     Shay Marcial is a 40 y.o. male to the history of GERD presenting to emergency department complaining of epigastric pain beginning about 2 days ago. Patient reports burning epigastric pain that sometimes moves up his throat associated with nausea and vomiting over the past 2 days. Patient reports pain began after he ate spicy food and pizza. Each of these foods have given him pain in the past that was improved with his Prilosec, however had not not eaten them together. He reports that he is taking his Prilosec but is been unable to keep down due to vomiting. He denies any blood in the stools, any dark/tarry stools, any lightheadedness, any chest pain, shortness of breath. He denies any fevers or chills    Other than stated above, no additional associated symptoms or aggravating or alleviating factors are noted    MEDICAL DECISION MAKING / ASSESSMENT / PLAN     INITIAL VITALS: BP: 104/72, Temp: 97.7 °F (36.5 °C), Heart Rate: 65, Resp: 16, SpO2: 100 %    Arik Landers is a 40 y.o. male with a history and presentation as described above in HPI. The patient was evaluated by myself and the ED Attending Physician, Dr. Richard Dent. All management and disposition plans were discussed and agreed upon. Upon presentation, the patient was Well-appearing, afebrile and hemodynamically stable. Pt was seen and evaluated in room 15, my initial evaluation reveals 64-year-old man with a history of GERD with symptoms suspicious for GERD with benign abdominal exam.    Pt initially treated with fluids, Zofran, Pepcid for pain and dehydration  On my reevaluation patient pain-free and tolerating p.o.     ED Course as of 04/17/23 1619   Mon Apr 17, 2023

## 2023-04-19 ENCOUNTER — HOSPITAL ENCOUNTER (EMERGENCY)
Age: 38
Discharge: HOME | End: 2023-04-19
Attending: EMERGENCY MEDICINE
Payer: COMMERCIAL

## 2023-04-19 VITALS
BODY MASS INDEX: 21.5 KG/M2 | HEART RATE: 76 BPM | TEMPERATURE: 97.9 F | RESPIRATION RATE: 16 BRPM | WEIGHT: 158.5 LBS | DIASTOLIC BLOOD PRESSURE: 71 MMHG | OXYGEN SATURATION: 100 % | SYSTOLIC BLOOD PRESSURE: 113 MMHG

## 2023-04-19 DIAGNOSIS — R10.13 ABDOMINAL PAIN, EPIGASTRIC: Primary | ICD-10-CM

## 2023-04-19 LAB
ALBUMIN SERPL-MCNC: 4.4 G/DL (ref 3.4–5)
ALP SERPL-CCNC: 65 U/L (ref 40–129)
ALT SERPL-CCNC: 13 U/L (ref 10–40)
ANION GAP SERPL CALCULATED.3IONS-SCNC: 8 MMOL/L (ref 3–16)
AST SERPL-CCNC: 14 U/L (ref 15–37)
BASOPHILS # BLD: 0.1 K/UL (ref 0–0.2)
BASOPHILS NFR BLD: 0.7 %
BILIRUB DIRECT SERPL-MCNC: <0.2 MG/DL (ref 0–0.3)
BILIRUB INDIRECT SERPL-MCNC: ABNORMAL MG/DL (ref 0–1)
BILIRUB SERPL-MCNC: <0.2 MG/DL (ref 0–1)
BUN SERPL-MCNC: 8 MG/DL (ref 7–20)
CALCIUM SERPL-MCNC: 9.6 MG/DL (ref 8.3–10.6)
CHLORIDE SERPL-SCNC: 99 MMOL/L (ref 99–110)
CO2 SERPL-SCNC: 31 MMOL/L (ref 21–32)
CREAT SERPL-MCNC: 0.8 MG/DL (ref 0.9–1.3)
DEPRECATED RDW RBC AUTO: 13.5 % (ref 12.4–15.4)
EOSINOPHIL # BLD: 0.2 K/UL (ref 0–0.6)
EOSINOPHIL NFR BLD: 2.1 %
GFR SERPLBLD CREATININE-BSD FMLA CKD-EPI: >60 ML/MIN/{1.73_M2}
GLUCOSE SERPL-MCNC: 131 MG/DL (ref 70–99)
HCT VFR BLD AUTO: 41.8 % (ref 40.5–52.5)
HGB BLD-MCNC: 14.5 G/DL (ref 13.5–17.5)
LIPASE SERPL-CCNC: 12 U/L (ref 13–60)
LYMPHOCYTES # BLD: 2.1 K/UL (ref 1–5.1)
LYMPHOCYTES NFR BLD: 27.3 %
MCH RBC QN AUTO: 30.8 PG (ref 26–34)
MCHC RBC AUTO-ENTMCNC: 34.7 G/DL (ref 31–36)
MCV RBC AUTO: 88.7 FL (ref 80–100)
MONOCYTES # BLD: 0.7 K/UL (ref 0–1.3)
MONOCYTES NFR BLD: 9.6 %
NEUTROPHILS # BLD: 4.7 K/UL (ref 1.7–7.7)
NEUTROPHILS NFR BLD: 60.3 %
PLATELET # BLD AUTO: 272 K/UL (ref 135–450)
PMV BLD AUTO: 6.4 FL (ref 5–10.5)
POTASSIUM SERPL-SCNC: 3.6 MMOL/L (ref 3.5–5.1)
PROT SERPL-MCNC: 7.5 G/DL (ref 6.4–8.2)
RBC # BLD AUTO: 4.71 M/UL (ref 4.2–5.9)
SODIUM SERPL-SCNC: 138 MMOL/L (ref 136–145)
WBC # BLD AUTO: 7.7 K/UL (ref 4–11)

## 2023-04-19 PROCEDURE — 99284 EMERGENCY DEPT VISIT MOD MDM: CPT

## 2023-04-19 PROCEDURE — 36415 COLL VENOUS BLD VENIPUNCTURE: CPT

## 2023-04-19 PROCEDURE — 85025 COMPLETE CBC W/AUTO DIFF WBC: CPT

## 2023-04-19 PROCEDURE — 83690 ASSAY OF LIPASE: CPT

## 2023-04-19 PROCEDURE — 96374 THER/PROPH/DIAG INJ IV PUSH: CPT

## 2023-04-19 PROCEDURE — 80048 BASIC METABOLIC PNL TOTAL CA: CPT

## 2023-04-19 PROCEDURE — 80076 HEPATIC FUNCTION PANEL: CPT

## 2023-04-19 PROCEDURE — 6370000000 HC RX 637 (ALT 250 FOR IP): Performed by: EMERGENCY MEDICINE

## 2023-04-19 PROCEDURE — 2580000003 HC RX 258: Performed by: EMERGENCY MEDICINE

## 2023-04-19 PROCEDURE — 6360000002 HC RX W HCPCS: Performed by: EMERGENCY MEDICINE

## 2023-04-19 RX ORDER — SUCRALFATE 1 G/1
1 TABLET ORAL 4 TIMES DAILY PRN
Qty: 120 TABLET | Refills: 3 | Status: SHIPPED | OUTPATIENT
Start: 2023-04-19

## 2023-04-19 RX ORDER — PANTOPRAZOLE SODIUM 40 MG/1
40 TABLET, DELAYED RELEASE ORAL ONCE
Status: COMPLETED | OUTPATIENT
Start: 2023-04-19 | End: 2023-04-19

## 2023-04-19 RX ORDER — PANTOPRAZOLE SODIUM 40 MG/1
40 TABLET, DELAYED RELEASE ORAL DAILY
Qty: 30 TABLET | Refills: 4 | Status: SHIPPED | OUTPATIENT
Start: 2023-04-19 | End: 2023-06-18

## 2023-04-19 RX ORDER — ONDANSETRON 4 MG/1
4 TABLET, ORALLY DISINTEGRATING ORAL 3 TIMES DAILY PRN
Qty: 21 TABLET | Refills: 0 | Status: SHIPPED | OUTPATIENT
Start: 2023-04-19

## 2023-04-19 RX ORDER — FAMOTIDINE 20 MG/1
20 TABLET, FILM COATED ORAL ONCE
Status: COMPLETED | OUTPATIENT
Start: 2023-04-19 | End: 2023-04-19

## 2023-04-19 RX ORDER — ONDANSETRON 2 MG/ML
4 INJECTION INTRAMUSCULAR; INTRAVENOUS ONCE
Status: COMPLETED | OUTPATIENT
Start: 2023-04-19 | End: 2023-04-19

## 2023-04-19 RX ORDER — 0.9 % SODIUM CHLORIDE 0.9 %
1000 INTRAVENOUS SOLUTION INTRAVENOUS ONCE
Status: COMPLETED | OUTPATIENT
Start: 2023-04-19 | End: 2023-04-19

## 2023-04-19 RX ADMIN — SODIUM CHLORIDE 1000 ML: 9 INJECTION, SOLUTION INTRAVENOUS at 03:19

## 2023-04-19 RX ADMIN — PANTOPRAZOLE SODIUM 40 MG: 40 TABLET, DELAYED RELEASE ORAL at 03:19

## 2023-04-19 RX ADMIN — ALUMINUM HYDROXIDE, MAGNESIUM HYDROXIDE, AND SIMETHICONE: 200; 200; 20 SUSPENSION ORAL at 03:19

## 2023-04-19 RX ADMIN — ONDANSETRON 4 MG: 2 INJECTION INTRAMUSCULAR; INTRAVENOUS at 03:20

## 2023-04-19 RX ADMIN — FAMOTIDINE 20 MG: 20 TABLET, FILM COATED ORAL at 03:20

## 2023-04-19 ASSESSMENT — PAIN - FUNCTIONAL ASSESSMENT: PAIN_FUNCTIONAL_ASSESSMENT: 0-10

## 2023-04-19 ASSESSMENT — PAIN SCALES - GENERAL: PAINLEVEL_OUTOF10: 7

## 2023-04-19 NOTE — DISCHARGE INSTRUCTIONS
At this time I think your symptoms are from a stomach ulcer. All of your blood work is normal.  I did not find any findings of abnormal gallbladder on your ultrasound. To help an ulcer heal it is very important to take acid reducing medications. I have prescribed you Protonix (pantoprazole) which is a medication that you should take every day no matter what for the next several months. When you were here last week, you were prescribed famotidine (pepcid) which is a medication you can take 2 times a day. I would recommend you taking this for the next several weeks as well but technically this medication can also only be taken when you are having severe symptoms. I have also prescribed you Carafate or sucralfate to take when you are having severe discomfort as well as Zofran that you can take when you are having significant nausea. Follow-up with a GI doctor to have endoscopy performed to look for ulcers.

## 2023-04-19 NOTE — ED PROVIDER NOTES
[address]          ATTENDING PHYSICIAN NOTE       Date of evaluation: 4/19/2023    Chief Complaint     Abdominal Pain (Pt states his epigastric region is inflamed. He has not gotten his pepcid prescription filled. He states this feels like hunger pains. States he has not had much of an appetite but it came back today. He states he ate some noodles and mashed potatoes )      History of Present Illness     Shay Joe is a 40 y.o. male who presents and had sudden epigastric pain that started up and could not keep anything down. Based on chart review it was like labs were checked and he went home feeling better. States for the last week he still feels like \"my stomach is not right\". States he will eat something and then had immediate onset of pain in his epigastrium but then soon after feel very hungry because he is not eating very much. Denies any nausea or vomiting, but states the pain is keeping him from eating. States he knows of things not right and he wants to get checked out. Does have a history of acid reflux that usually feels like bubbles in his stomach, does not feel like this severe pain. No history of ulcers. Has never seen gastroenterology. Has not been taking his PPI prescription because he has not gotten it filled yet from his visit last week. Morley like he might not need to be taking it since he is not eating. Other than epigastric pain denies any abdominal pain or chest pain. Denies shortness of breath. Denies fevers. States pain is currently mild to moderate, severe only after eating. Review of Systems     Review of Systems a complete review of systems is obtained and is negative unless stated otherwise in HPI above    Past Medical, Surgical, Family, and Social History     He has a past medical history of Anxiety, Depression, Paranoia ([address] and PTSD (post-traumatic stress disorder).   He has a past surgical history that includes

## 2024-12-13 ENCOUNTER — HOSPITAL ENCOUNTER (EMERGENCY)
Age: 39
Discharge: HOME | End: 2024-12-13
Attending: STUDENT IN AN ORGANIZED HEALTH CARE EDUCATION/TRAINING PROGRAM
Payer: COMMERCIAL

## 2024-12-13 VITALS
SYSTOLIC BLOOD PRESSURE: 106 MMHG | BODY MASS INDEX: 22.26 KG/M2 | WEIGHT: 159 LBS | RESPIRATION RATE: 16 BRPM | HEART RATE: 97 BPM | HEIGHT: 71 IN | DIASTOLIC BLOOD PRESSURE: 72 MMHG | OXYGEN SATURATION: 99 % | TEMPERATURE: 98.7 F

## 2024-12-13 DIAGNOSIS — Z71.1 CONCERN ABOUT STD IN MALE WITHOUT DIAGNOSIS: Primary | ICD-10-CM

## 2024-12-13 LAB
BACTERIA URNS QL MICRO: ABNORMAL /HPF
BILIRUB UR QL STRIP.AUTO: NEGATIVE
CLARITY UR: ABNORMAL
COLOR UR: YELLOW
EPI CELLS #/AREA URNS HPF: ABNORMAL /HPF (ref 0–5)
GLUCOSE UR STRIP.AUTO-MCNC: NEGATIVE MG/DL
HGB UR QL STRIP.AUTO: NEGATIVE
KETONES UR STRIP.AUTO-MCNC: NEGATIVE MG/DL
LEUKOCYTE ESTERASE UR QL STRIP.AUTO: ABNORMAL
MUCOUS THREADS #/AREA URNS LPF: ABNORMAL /LPF
NITRITE UR QL STRIP.AUTO: NEGATIVE
PH UR STRIP.AUTO: 7 [PH] (ref 5–8)
PROT UR STRIP.AUTO-MCNC: NEGATIVE MG/DL
RBC #/AREA URNS HPF: ABNORMAL /HPF (ref 0–4)
SP GR UR STRIP.AUTO: 1.02 (ref 1–1.03)
UA COMPLETE W REFLEX CULTURE PNL UR: YES
UA DIPSTICK W REFLEX MICRO PNL UR: YES
URN SPEC COLLECT METH UR: ABNORMAL
UROBILINOGEN UR STRIP-ACNC: 0.2 E.U./DL
WBC #/AREA URNS HPF: >100 /HPF (ref 0–5)

## 2024-12-13 PROCEDURE — 96372 THER/PROPH/DIAG INJ SC/IM: CPT

## 2024-12-13 PROCEDURE — 86702 HIV-2 ANTIBODY: CPT

## 2024-12-13 PROCEDURE — 87491 CHLMYD TRACH DNA AMP PROBE: CPT

## 2024-12-13 PROCEDURE — 81001 URINALYSIS AUTO W/SCOPE: CPT

## 2024-12-13 PROCEDURE — 87390 HIV-1 AG IA: CPT

## 2024-12-13 PROCEDURE — 6370000000 HC RX 637 (ALT 250 FOR IP): Performed by: STUDENT IN AN ORGANIZED HEALTH CARE EDUCATION/TRAINING PROGRAM

## 2024-12-13 PROCEDURE — 87591 N.GONORRHOEAE DNA AMP PROB: CPT

## 2024-12-13 PROCEDURE — 87086 URINE CULTURE/COLONY COUNT: CPT

## 2024-12-13 PROCEDURE — 99284 EMERGENCY DEPT VISIT MOD MDM: CPT

## 2024-12-13 PROCEDURE — 6360000002 HC RX W HCPCS: Performed by: STUDENT IN AN ORGANIZED HEALTH CARE EDUCATION/TRAINING PROGRAM

## 2024-12-13 PROCEDURE — 86701 HIV-1ANTIBODY: CPT

## 2024-12-13 PROCEDURE — 87077 CULTURE AEROBIC IDENTIFY: CPT

## 2024-12-13 RX ORDER — DOXYCYCLINE 100 MG/1
100 CAPSULE ORAL ONCE
Status: COMPLETED | OUTPATIENT
Start: 2024-12-13 | End: 2024-12-13

## 2024-12-13 RX ORDER — DOXYCYCLINE HYCLATE 100 MG
100 TABLET ORAL 2 TIMES DAILY
Qty: 14 TABLET | Refills: 0 | Status: SHIPPED | OUTPATIENT
Start: 2024-12-13 | End: 2024-12-20

## 2024-12-13 RX ADMIN — DOXYCYCLINE 100 MG: 100 CAPSULE ORAL at 16:24

## 2024-12-13 RX ADMIN — LIDOCAINE HYDROCHLORIDE 500 MG: 10 INJECTION, SOLUTION INFILTRATION; PERINEURAL at 16:24

## 2024-12-13 ASSESSMENT — LIFESTYLE VARIABLES
HOW MANY STANDARD DRINKS CONTAINING ALCOHOL DO YOU HAVE ON A TYPICAL DAY: 1 OR 2
HOW OFTEN DO YOU HAVE A DRINK CONTAINING ALCOHOL: 2-4 TIMES A MONTH

## 2024-12-13 ASSESSMENT — PAIN - FUNCTIONAL ASSESSMENT: PAIN_FUNCTIONAL_ASSESSMENT: NONE - DENIES PAIN

## 2024-12-13 NOTE — DISCHARGE INSTRUCTIONS
As we discussed please follow-up with your primary doctor in the next 3 to 4 days, discuss symptoms with your sexual partner and recommend that he/she be seen for treatment as well.  You should receive a notification if your tests are positive although I recommended that you call to confirm that.  Return to the emergency department if you have further symptoms you find concerning for emergent illness or injury.  A prescription has been provided for doxycycline, take this twice a day every day for 7 days until complete.

## 2024-12-14 LAB
HIV 1+2 AB+HIV1 P24 AG SERPL QL IA: NORMAL
HIV 2 AB SERPL QL IA: NORMAL
HIV1 AB SERPL QL IA: NORMAL
HIV1 P24 AG SERPL QL IA: NORMAL

## 2024-12-14 NOTE — ED PROVIDER NOTES
[facility] EMERGENCY DEPARTMENT  EMERGENCY DEPARTMENT ENCOUNTER      Pt Name: Shay Cuellar  MRN: [id number]  Birthdate 1985  Date of evaluation: 12/13/2024  Provider: RAHEL SWAIN MD     CHIEF COMPLAINT       Chief Complaint   Patient presents with    Exposure to STD     Pt believes that he may have been exposed to an STI. Stated he noticed a discharge yesterday, and discomfort with urination.         HISTORY OF PRESENT ILLNESS   (Location/Symptom, Timing/Onset, Context/Setting, Quality, Duration, Modifying Factors, Severity) Note limiting factors.   I wore appropriate PPE for the entirety of this encounter.      HPI    Shay Cuellar is a 39 y.o. male who presents to the emergency department for evaluation of penile discharge.  States about a week ago he had unprotected sex with a new partner and recently has had burning on urination and discharge from his penis.  Denies fever/chills, nausea/vomiting, chest pain, abdominal pain or other associated symptoms.  Request STD test and treatment.      Nursing Notes were reviewed.  Limitations to history:  Outside historians:    REVIEW OF SYSTEMS     Review of Systems as documented in HPI above.     PAST MEDICAL HISTORY     Past Medical History:   Diagnosis Date    Anxiety     Depression     Paranoia (HCC)     PTSD (post-traumatic stress disorder)        SURGICAL HISTORY       Past Surgical History:   Procedure Laterality Date    FRACTURE SURGERY         CURRENT MEDICATIONS       Discharge Medication List as of 12/13/2024  5:32 PM        CONTINUE these medications which have NOT CHANGED    Details   pantoprazole (PROTONIX) 40 MG tablet Take 1 tablet by mouth daily, Disp-30 tablet, R-4Print      ondansetron (ZOFRAN-ODT) 4 MG disintegrating tablet Take 1 tablet by mouth 3 times daily as needed for Nausea or Vomiting, Disp-21 tablet, R-0Print      sucralfate (CARAFATE) 1 GM tablet Take 1 tablet by mouth 4 times daily as needed (for abd pain (upper)), Disp-120  Pulse 97   Temp 98.7 °F (37.1 °C) (Oral)   Resp 16   Ht 1.803 m (5' 11\")   Wt 72.1 kg (159 lb)   SpO2 99%   BMI 22.18 kg/m²   Constitutional: Alert, awake, appears non-toxic.  Heart: Regular rate and regular rhythm.     Vascular: Radial pulses 2+ and symmetric.  Abdomen: Soft, non-tender, non-distended, without guarding or peritoneal signs.  Neurologic: Mental status: Alert and oriented to person, place and time.  Attention, recent recall and speech normal.  Patient ambulates with a steady gait.  Musculoskeletal: Normal bulk and tone without evidence of trauma.  Skin: Warm and dry, no appreciable rash.  Psychiatric: Cooperative, appropriate thought content and judgement.        DIAGNOSTIC RESULTS     RADIOLOGY   Interpretation per the Radiologist below, if available at the time of this note:  No results found.    ED BEDSIDE ULTRASOUND:   Performed by ED Physician -none    LABS:  Labs Reviewed   URINALYSIS WITH REFLEX TO CULTURE - Abnormal; Notable for the following components:       Result Value    Clarity, UA SL CLOUDY (*)     Leukocyte Esterase, Urine SMALL (*)     All other components within normal limits   MICROSCOPIC URINALYSIS - Abnormal; Notable for the following components:    Mucus, UA 2+ (*)     WBC, UA >100 (*)     Bacteria, UA 1+ (*)     All other components within normal limits   C.TRACHOMATIS N.GONORRHOEAE DNA, URINE   CULTURE, URINE   HIV SCREEN        All other labs were within normal range or not returned as of this dictation.    EMERGENCY DEPARTMENT COURSE and DIFFERENTIAL DIAGNOSIS/MDM:   Vitals:    Vitals:    12/13/24 1547   BP: 106/72   Pulse: 97   Resp: 16   Temp: 98.7 °F (37.1 °C)   TempSrc: Oral   SpO2: 99%   Weight: 72.1 kg (159 lb)   Height: 1.803 m (5' 11\")       The patient is a 39 y.o. male who presented with a chief complaint of urethral discharge as above. The differential diagnosis associated with this patient's presentation includes but is not limited to gonorrhea, chlamydia,

## 2024-12-15 LAB
BACTERIA UR CULT: ABNORMAL
ORGANISM: ABNORMAL

## 2024-12-16 LAB
C TRACH DNA UR QL NAA+PROBE: POSITIVE
N GONORRHOEA DNA UR QL NAA+PROBE: NEGATIVE